# Patient Record
Sex: MALE | Race: WHITE | NOT HISPANIC OR LATINO | URBAN - NONMETROPOLITAN AREA
[De-identification: names, ages, dates, MRNs, and addresses within clinical notes are randomized per-mention and may not be internally consistent; named-entity substitution may affect disease eponyms.]

---

## 2021-06-29 ENCOUNTER — OFFICE VISIT (OUTPATIENT)
Dept: UROLOGY | Facility: CLINIC | Age: 46
End: 2021-06-29

## 2021-06-29 VITALS — TEMPERATURE: 96.8 F | BODY MASS INDEX: 28.73 KG/M2 | HEIGHT: 68 IN | WEIGHT: 189.6 LBS

## 2021-06-29 DIAGNOSIS — Z30.09 VASECTOMY EVALUATION: Primary | ICD-10-CM

## 2021-06-29 PROCEDURE — 99203 OFFICE O/P NEW LOW 30 MIN: CPT | Performed by: UROLOGY

## 2021-06-29 RX ORDER — HYDROCODONE BITARTRATE AND ACETAMINOPHEN 7.5; 325 MG/1; MG/1
1 TABLET ORAL EVERY 6 HOURS PRN
Qty: 16 TABLET | Refills: 0 | Status: SHIPPED | OUTPATIENT
Start: 2021-06-29 | End: 2021-07-03

## 2021-06-29 RX ORDER — ALPRAZOLAM 2 MG/1
2 TABLET ORAL ONCE
Qty: 1 TABLET | Refills: 0 | Status: SHIPPED | OUTPATIENT
Start: 2021-06-29 | End: 2021-06-29

## 2021-06-29 NOTE — PROGRESS NOTES
"Chief Complaint  Elective Sterilization    Subjective          Rafita Wan presents to [facility] for   History of Present Illness  The patient has been pondering the option of a vasectomy for2 years. Anatomically this is a lower genital tract issue/procedure. With regard to context of the decision, he presently has 3 children. He is not . Associated/Relevant symptoms/signs include None. He voices no additional questions about birth control options.       No current outpatient medications on file.  History reviewed. No pertinent past medical history.  History reviewed. No pertinent surgical history.      Review  of systems  Constitutional: Negative for chills and fever.   Gastrointestinal: Negative for abdominal pain, anal bleeding and blood in stool.   Genitourinary: Negative for flank pain and hematuria.       Objective   PHYSICAL EXAM  Vital Signs:   Temp 96.8 °F (36 °C)   Ht 172.7 cm (68\")   Wt 86 kg (189 lb 9.6 oz)   BMI 28.83 kg/m²       Constitutional: Well nourished, Well developed; No apparent distress  Psychiatric: Appropriate affect; Alert and oriented  Musculoskeletal: Normal gait and station  GI: Abdomen is soft, non-tender  Respiratory: No distress; Unlabored movement; No accessory musculature needed with symmetric movements  ; Penis and testicles are normal.  The vas deferens is palpable bilaterally and appears accessible for vasectomy.  Vitals reviewed.        DATA  Result Review :         No results found for this or any previous visit.             ASSESSMENT AND PLAN      Problem List Items Addressed This Visit     None      Visit Diagnoses     Vasectomy evaluation    -  Primary      The patient desires vasectomy.  Risks of this procedure are discussed.  We discussed that it does take up to 6 months for a patient to clear the proximal sperm through the process of ejaculation.  It is explained that postoperative specimens are essential before consideration " of birth control cessation.  Risks of bleeding, infection, sperm granuloma, testicular pain that can become prolonged such as post vasectomy neuralgia, recanalization with resumption of fertility status, testicular atrophy are included in the discussion.  The patient is made aware of other birth control options including permanent sterilization procedures for females.  It is also explained the vasectomy does not reduce the risk of sexually transmitted disease.  Discussion of the use of preprocedural benzodiazepine and postoperative opiate narcotic relief is also undertaken.  Risks and  addictive properties discussed.  The patient has consented to the procedure.        FOLLOW UP     No follow-ups on file.        (Please note that portions of this note were completed with a voice recognition program.)  Jaime Arango MD  06/29/21  08:39 CDT

## 2021-07-13 NOTE — PROGRESS NOTES
CC: I am here to have a vasectomy    Vasectomy procedure note  Patient has been premedicated with alprazolam and Norco.  He has done the appropriate shave the day before the procedure.  He is placed in the supine position.  The usual prep and drape with Betadine is carried out.  The vas is palpated on the right side and  from the remainder of the cord bluntly and pulled just underneath the skin.  1% lidocaine is infiltrated in the subcutaneous tissue.  An incision is made directly onto the vas.  The perivasal tissue was bluntly stripped away from the vas deferens freeing an approximate 2 cm segment.  Titanium clips were placed both proximally and distally and the intervening segment excised.  The specimen,is discarded.  The ends are fulgurated with electrocautery as well as any vessels.    The left-sided vasectomy was carried out the same as the right with no change in findings or technique.  Both wounds are irrigated with sterile saline.  Any subcutaneous vessels that are bleeding are fulgurated.  The skin is closed with a running 3-0 chromic suture.    The patient tolerated this procedure well.  Postoperative instructions were given.  He is reminded that we will need to see  a sample after approximately 20-25 ejaculations to determine whether or not he has had a successful vasectomy.  He is encouraged to use birth control up until that time.    Jaime Arango MD  7/16/2021  12:20 CDT

## 2021-07-16 ENCOUNTER — OFFICE VISIT (OUTPATIENT)
Dept: UROLOGY | Facility: CLINIC | Age: 46
End: 2021-07-16

## 2021-07-16 DIAGNOSIS — Z30.09 VASECTOMY EVALUATION: Primary | ICD-10-CM

## 2021-07-16 PROCEDURE — 55250 REMOVAL OF SPERM DUCT(S): CPT | Performed by: UROLOGY
